# Patient Record
Sex: FEMALE | Race: WHITE | NOT HISPANIC OR LATINO | Employment: OTHER
[De-identification: names, ages, dates, MRNs, and addresses within clinical notes are randomized per-mention and may not be internally consistent; named-entity substitution may affect disease eponyms.]

---

## 2018-03-03 ENCOUNTER — HEALTH MAINTENANCE LETTER (OUTPATIENT)
Age: 48
End: 2018-03-03

## 2018-10-11 ENCOUNTER — TELEPHONE (OUTPATIENT)
Dept: ORTHOPEDICS | Facility: CLINIC | Age: 48
End: 2018-10-11

## 2018-10-12 NOTE — TELEPHONE ENCOUNTER
Paged re: sensation of burning/cold along posterior calf and anterior shin within SLC. Denies new numbness, tingling, or motor weakness. No fever/chills. No increase in pain. Taken off 21-hour elevation restriction a few days ago. Symptoms started yesterday. Current SLC was placed 1 week ago. Discussed with her that she should elevate her leg more to decrease swelling, and this may help reduce irritation of superficial nerves from the cast that could be causing her symptoms. Patient agreeable with this plan. She will call clinic tomorrow to request a cast change.     Robert Rolon [facility]  Orthopaedic PGY4  Pager: [phone]

## 2020-02-10 ENCOUNTER — HEALTH MAINTENANCE LETTER (OUTPATIENT)
Age: 50
End: 2020-02-10

## 2023-08-16 ENCOUNTER — TRANSFERRED RECORDS (OUTPATIENT)
Dept: HEALTH INFORMATION MANAGEMENT | Facility: CLINIC | Age: 53
End: 2023-08-16

## 2023-08-17 ENCOUNTER — TRANSCRIBE ORDERS (OUTPATIENT)
Dept: OTHER | Age: 53
End: 2023-08-17

## 2023-08-17 DIAGNOSIS — M24.9 HYPERMOBILITY OF JOINT: Primary | ICD-10-CM

## 2023-08-22 ENCOUNTER — TRANSFERRED RECORDS (OUTPATIENT)
Dept: HEALTH INFORMATION MANAGEMENT | Facility: CLINIC | Age: 53
End: 2023-08-22

## 2024-05-02 ENCOUNTER — TRANSFERRED RECORDS (OUTPATIENT)
Dept: HEALTH INFORMATION MANAGEMENT | Facility: CLINIC | Age: 54
End: 2024-05-02
Payer: COMMERCIAL

## 2024-05-02 ENCOUNTER — MEDICAL CORRESPONDENCE (OUTPATIENT)
Dept: HEALTH INFORMATION MANAGEMENT | Facility: CLINIC | Age: 54
End: 2024-05-02
Payer: COMMERCIAL

## 2024-05-14 ENCOUNTER — TRANSCRIBE ORDERS (OUTPATIENT)
Dept: OTHER | Age: 54
End: 2024-05-14

## 2024-05-14 DIAGNOSIS — G62.9 NEUROPATHY: Primary | ICD-10-CM

## 2024-05-15 NOTE — TELEPHONE ENCOUNTER
Records Requested     May 15, 2024 1:07 PM   [id number]   Facility  [facility]   Outcome 1:11 pm Sent request for appointment notes to be faxed. -ADRIAN Yanes on 5/21/2024 at 2:31 PM Records received & sent to scanning. -ADRIAN     Records Requested     May 15, 2024 1:15 PM   [id number]   Facility  [facility]   Outcome 1:17 pm Sent request for imaging to be pushed to PACS. -ADRIAN     Action 5/16/24 MV 11.23am   Action Taken [facility] images resolved in PACS       RECORDS RECEIVED FROM: [facility]   REASON FOR VISIT: Neuropathy   PROVIDER: Gregory Kenyon MD   DATE OF APPT: 5/30/24 @ 7:30 am    NOTES (FOR ALL VISITS) STATUS DETAILS   OFFICE NOTE from referring provider Care Everywhere 5/2/24 Hari Jett, RN,CNP  @[facility]     OFFICE NOTE from other specialist Received/CE 10/16/23, 2/6/23 Negra De Leon APRN,DNP  [facility]    Dr Angela Mccarthy @ [facility]:  8/16/23 12/5/22 Vesna Nguyễn APRN,CNP  @[email or web address]    10/7/22 Eladia Leal MD  @[facility]     MEDICATION LIST Internal    IMAGING  (FOR ALL VISITS)     MRI (HEAD, NECK, SPINE) PACS [facility]*  6/12/20 MR Ankle

## 2024-05-20 ENCOUNTER — TRANSFERRED RECORDS (OUTPATIENT)
Dept: HEALTH INFORMATION MANAGEMENT | Facility: CLINIC | Age: 54
End: 2024-05-20
Payer: COMMERCIAL

## 2024-05-30 ENCOUNTER — PRE VISIT (OUTPATIENT)
Dept: NEUROLOGY | Facility: CLINIC | Age: 54
End: 2024-05-30

## 2024-05-30 ENCOUNTER — LAB (OUTPATIENT)
Dept: LAB | Facility: CLINIC | Age: 54
End: 2024-05-30
Payer: COMMERCIAL

## 2024-05-30 ENCOUNTER — OFFICE VISIT (OUTPATIENT)
Dept: NEUROLOGY | Facility: CLINIC | Age: 54
End: 2024-05-30
Attending: NURSE PRACTITIONER
Payer: COMMERCIAL

## 2024-05-30 VITALS
DIASTOLIC BLOOD PRESSURE: 85 MMHG | WEIGHT: 174.9 LBS | SYSTOLIC BLOOD PRESSURE: 128 MMHG | HEART RATE: 52 BPM | OXYGEN SATURATION: 100 % | BODY MASS INDEX: 25.1 KG/M2

## 2024-05-30 DIAGNOSIS — R29.2 HYPERREFLEXIA: Primary | ICD-10-CM

## 2024-05-30 DIAGNOSIS — G62.9 NEUROPATHY: Primary | ICD-10-CM

## 2024-05-30 DIAGNOSIS — G62.9 NEUROPATHY: ICD-10-CM

## 2024-05-30 LAB
ERYTHROCYTE [SEDIMENTATION RATE] IN BLOOD BY WESTERGREN METHOD: 9 MM/HR (ref 0–30)
VIT B12 SERPL-MCNC: 1934 PG/ML (ref 232–1245)

## 2024-05-30 PROCEDURE — 86235 NUCLEAR ANTIGEN ANTIBODY: CPT | Performed by: PSYCHIATRY & NEUROLOGY

## 2024-05-30 PROCEDURE — 82607 VITAMIN B-12: CPT | Performed by: PSYCHIATRY & NEUROLOGY

## 2024-05-30 PROCEDURE — 85652 RBC SED RATE AUTOMATED: CPT | Performed by: PATHOLOGY

## 2024-05-30 PROCEDURE — 83921 ORGANIC ACID SINGLE QUANT: CPT | Performed by: PSYCHIATRY & NEUROLOGY

## 2024-05-30 PROCEDURE — 99204 OFFICE O/P NEW MOD 45 MIN: CPT | Performed by: PSYCHIATRY & NEUROLOGY

## 2024-05-30 PROCEDURE — 86038 ANTINUCLEAR ANTIBODIES: CPT | Performed by: PSYCHIATRY & NEUROLOGY

## 2024-05-30 PROCEDURE — 36415 COLL VENOUS BLD VENIPUNCTURE: CPT | Performed by: PATHOLOGY

## 2024-05-30 RX ORDER — ZALEPLON 5 MG/1
5 CAPSULE ORAL
COMMUNITY

## 2024-05-30 RX ORDER — FLUTICASONE PROPIONATE 50 MCG
SPRAY, SUSPENSION (ML) NASAL
COMMUNITY

## 2024-05-30 RX ORDER — LISDEXAMFETAMINE DIMESYLATE 40 MG/1
1 CAPSULE ORAL EVERY MORNING
COMMUNITY
Start: 2023-05-11

## 2024-05-30 RX ORDER — ESTRADIOL 0.04 MG/D
PATCH, EXTENDED RELEASE TRANSDERMAL
COMMUNITY

## 2024-05-30 RX ORDER — NALTREXONE HYDROCHLORIDE 50 MG/1
TABLET, FILM COATED ORAL
COMMUNITY
Start: 2024-01-02

## 2024-05-30 RX ORDER — ACETAMINOPHEN 500 MG
1000 TABLET ORAL
COMMUNITY

## 2024-05-30 RX ORDER — CYANOCOBALAMIN (VITAMIN B-12) 2500 MCG
2500 TABLET, SUBLINGUAL SUBLINGUAL DAILY
COMMUNITY

## 2024-05-30 RX ORDER — MULTIVIT-MIN/IRON/FOLIC ACID/K 18-600-40
CAPSULE ORAL
COMMUNITY

## 2024-05-30 RX ORDER — PROGESTERONE 100 MG/1
100 CAPSULE ORAL AT BEDTIME
COMMUNITY

## 2024-05-30 RX ORDER — CHOLECALCIFEROL (VITAMIN D3) 125 MCG
CAPSULE ORAL
COMMUNITY

## 2024-05-30 RX ORDER — FAMOTIDINE 40 MG/1
1 TABLET, FILM COATED ORAL
COMMUNITY
Start: 2024-03-25

## 2024-05-30 RX ORDER — VITAMIN B COMPLEX
TABLET ORAL DAILY
COMMUNITY

## 2024-05-30 RX ORDER — CETIRIZINE HYDROCHLORIDE 10 MG/1
CAPSULE, LIQUID FILLED ORAL
COMMUNITY

## 2024-05-30 RX ORDER — IBUPROFEN 400 MG/1
400 TABLET, FILM COATED ORAL
COMMUNITY

## 2024-05-30 ASSESSMENT — PAIN SCALES - GENERAL: PAINLEVEL: MILD PAIN (3)

## 2024-05-30 NOTE — LETTER
01/04/2018  Saadia Rivera 2024       RE: Karissa Rowell  [address] [place] [address]     Dear Colleague,    Thank you for referring your patient, Karissa Rowell, to the [facility] NEUROLOGY [facility] [place] at [facility]. Please see a copy of my visit note below.      DEPARTMENT OF NEUROLOGY    Patient Name:  Karissa Rowell  MRN:  [id number]    :  1970  Date of Clinic Visit:  May 30, 2024  Primary Care Provider:  Gregory Cano    Assessment:   Karissa Rowell is a 53-year-old woman who presents to the neuromuscular clinic this morning for evaluation of burning discomfort in her feet and other symptoms that have raised concern for a potential small fiber neuropathy.  Her examination today is largely normal; she does have some very subtle asymmetry and vibratory sensation at the level of the great toes as well as modestly brisk reflexes with trace spread but no significant asymmetry, clonus, or extensor plantar responses.  It is unclear to me whether her problem is neurologic or rheumatologic in origin.  Her report of dry eyes and dry mouth, taken together with her history of presenting symptoms, does raise some concern for a potential rheumatologic diagnosis, such as Sjogren's syndrome.  We will do some blood work to look into these possibilities today.  I have a relatively low suspicion for a large fiber neuropathy given her essentially normal sensory testing, but I do believe pursuing an EMG would be worthwhile and we could consider a skin biopsy to evaluate for a small fiber neuropathy depending upon the results of this test.  Given her hyperreflexia, I believe it would also be reasonable to obtain a brain MRI to rule out a structural central cause.  She has already had an MRI of her cervical spine completed so we will review these images as well.      Plan:  - We will review the patient's  cervical spine MRI  - MRI of the brain without contrast  - Blood work today (INOCENCIO, DORI, ESR, B12, MMA)  - EMG and skin biopsy of the foot and distal calf if EMG is normal      Patient has been seen with Dr. Kenyon who agrees with my assessment and plan.    Alfredo Cummings MD  [facility] Department of Neurology PGY-4    I personally examined the patient and concur with the resident's note.     Gregory Kenyon M.D.      This note consists of symbols derived from keyboarding, dictation and/or voice recognition software. As a result, there may be errors in the document that have gone undetected. Please consider this when interpreting information presented here within.      ------------------------------------------------------------------------------------------------------------    Chief Complaint: Burning discomfort in the lower extremities      HPI: Karissa Rowell is a 53-year-old woman with a history of anxiety, Altagracia-Danlos, mast cell activation syndrome, and cervical dystonia who presents to neurology for evaluation of burning discomfort in the lower extremities.  She explains that she had been in normal health up until 3 years ago, around the time that she experienced a GI illness with diarrhea and nausea, which she believes to be related to COVID (although she was not formally tested at that time).  Around that same time, she had a right midfoot fusion with subsequent development of burning pain in the right foot.  This was initially thought to represent complex regional pain syndrome, and she underwent formal testing for this; per her recollection, she almost met criteria for CRPS, although she does not believe that this is the correct diagnosis for her symptoms, as she later went on to develop the same burning pain in her left foot.  At present, she feels as though the burning discomfort is more significant in her left foot than the right.      Generally speaking, this burning discomfort  is present about 90% of the time and seems to get worse throughout the day; it involves the entire foot and lower leg up to about the mid shin.  She has found that wearing socks seems to be particularly bothersome.  In fact, she reports that she had to wear sandals for about 7 months last year due to the irritation that socks would cause her feet.  She reports that wearing socks is still bothersome, but not quite as bad.  She also notes that, given concern for POTS, she started wearing compression stockings; these had been very helpful in terms of giving her energy and improving her brain fog, but she was not able to tolerate the irritation these would cause her.  She also notes that wearing tight shoes seems to make her symptoms worse.  Of note, she had previously been prescribed gabapentin for her burning pain but this was ineffective in treating her pain and caused worsening suicidal ideation.    The patient has never seen a rheumatologist or neurologist before, although she has been undergoing a workup with Dr. Mccarthy who is an expert in Altagracia-Danlos.  As part of that workup, she did have imaging done of her cervical spine, which reportedly showed evidence of a Chiari malformation.  The patient has not noticed that her symptoms get worse with coughing, bearing down, or with neck movements.  She does have cervical dystonia and was receiving Botox for this, but she does not plan on continuing this treatment due to her underlying connective tissue disorder.  She does endorse constant neck pain that is responsive to ibuprofen.    In addition to the symptoms outlined above, the patient does endorse intermittent skin discoloration.  She explains that her fingers will intermittently become pale without a clear trigger, such as a change in temperature.  She also adds that her feet will sometimes get very bright after standing for a few minutes.  She notes that her feet will oftentimes get red after taking a shower but  "she is not sure if this is accompanied by any change in her symptoms.  She also notes that her skin has appeared increasingly shiny over the past few years.  Her hands and feet also feel cold at all times and she will have intermittent digit numbness and zingers.  She reports that sensation of pressure, temperature, and pain are preserved in her feet, although light touch does cause her pain.    The patient denies any trouble with constipation.  She will intermittently have some diarrhea but thinks that this is more diet related.  Since the onset of her symptoms, she feels like her appetite has gone down, although she reports that she has gained about 20 pounds in the past 2 years due to her inability to maintain her exercise routine.  She does feel as though her gait is a little more unsteady and that she is no longer walking as assertive as she had previously been.  She also endorses feeling a little off balance in general relative to her previous self.    In terms of hand functioning, she does feel like there has been some reduction in her ability to feel things with her hands and she now was needing to be more intentional with grabbing items.    The patient also endorses chronically dry eyes as well as dry mouth.  She will intermittently notice red, inflamed joints in her hands.  The patient is an only child and does not have any children.  Her mother does have weakness and numbness in her feet, and her maternal aunt has similar symptoms as well.  She reports that her father has numbness in his feet as well but he also has a history of back surgery.    Vital signs:      BP: 128/85 Pulse: 52     SpO2: 100 %       Weight: 79.3 kg (174 lb 14.4 oz)  Estimated body mass index is 25.1 kg/m  as calculated from the following:    Height as of 7/17/15: 1.778 m (5' 10\").    Weight as of this encounter: 79.3 kg (174 lb 14.4 oz).    Examination:   -General: Sitting comfortably on the chair. No acute distress.  -HEENT: No " skin discolorations noted. Head is normocephalic, atraumatic.   -Pulm: Normal work of breathing on RA.   -Abdomen: Non-distended.   -Skin: Clinodactyly noted.  Mild mottling of the skin in the feet.  No abnormal     -Neurological:   --MS: Patient is alert, attentive, and oriented. Speech is clear and fluid. Names and repeats normally. Remote memory intact.   --CNs: Pupils symmetric, round and reactive to light. No APD. Visual fields are full. Ocular motility is full without nystagmus. Facial sensation intact. Muscles of mastication and facial expression normal. Hearing intact to conversation. Shoulder shrug is strong and symmetric. Tongue motions normal.   --Motor: Normal muscle tone and bulk. Muscle strength is 5/5 and symmetric with testing of the deltoids, biceps, triceps, wrist extension, finger abduction, hip flexors and extensors, knee flexors/extensors, and ankle dorsi- and plantarflexion.  --Reflexes: 2+ and symmetric at biceps, BR, patellae and Achilles. Spread to the finger flexors on right with BR and with biceps and BR on the left. Wagner positive bilaterally, more so on the R than L. Crossed adduction noted bilaterally. Plantar responses mute bilaterally.   --Sensory: Light touch intact throughout upper and lower extremities. Vibration extinguishes after 5 seconds with testing at the R great toe, and 8 seconds with testing at the L great toe. Normal pinprick sensation in BLE. Negative Romberg.  --Coordination: Heel-shin and finger-nose-finger without dysmetria.   --Gait: Stands with feet normally spaced. Gait is steady. Normal arm swing. Mild difficulty with walking on heels and in tandem.         Again, thank you for allowing me to participate in the care of your patient.      Sincerely,    Gregory Kenyon MD

## 2024-05-30 NOTE — PROGRESS NOTES
DEPARTMENT OF NEUROLOGY    Patient Name:  Karissa Rowell  MRN:  [id number]    :  1970  Date of Clinic Visit:  May 30, 2024  Primary Care Provider:  Gregory Cano    Assessment:   Karissa Rowell is a 53-year-old woman who presents to the neuromuscular clinic this morning for evaluation of burning discomfort in her feet and other symptoms that have raised concern for a potential small fiber neuropathy.  Her examination today is largely normal; she does have some very subtle asymmetry and vibratory sensation at the level of the great toes as well as modestly brisk reflexes with trace spread but no significant asymmetry, clonus, or extensor plantar responses.  It is unclear to me whether her problem is neurologic or rheumatologic in origin.  Her report of dry eyes and dry mouth, taken together with her history of presenting symptoms, does raise some concern for a potential rheumatologic diagnosis, such as Sjogren's syndrome.  We will do some blood work to look into these possibilities today.  I have a relatively low suspicion for a large fiber neuropathy given her essentially normal sensory testing, but I do believe pursuing an EMG would be worthwhile and we could consider a skin biopsy to evaluate for a small fiber neuropathy depending upon the results of this test.  Given her hyperreflexia, I believe it would also be reasonable to obtain a brain MRI to rule out a structural central cause.  She has already had an MRI of her cervical spine completed so we will review these images as well.      Plan:  - We will review the patient's cervical spine MRI  - MRI of the brain without contrast  - Blood work today (INOCENCIO, DORI, ESR, B12, MMA)  - EMG and skin biopsy of the foot and distal calf if EMG is normal      Patient has been seen with Dr. Kenyon who agrees with my assessment and plan.    Alfredo Cummings MD  [facility] Department of Neurology PGY-4    I personally examined  the patient and concur with the resident's note.     Gregory Kenyon M.D.      This note consists of symbols derived from keyboarding, dictation and/or voice recognition software. As a result, there may be errors in the document that have gone undetected. Please consider this when interpreting information presented here within.      ------------------------------------------------------------------------------------------------------------    Chief Complaint: Burning discomfort in the lower extremities      HPI: Karissa Rowell is a 53-year-old woman with a history of anxiety, Altagracia-Danlos, mast cell activation syndrome, and cervical dystonia who presents to neurology for evaluation of burning discomfort in the lower extremities.  She explains that she had been in normal health up until 3 years ago, around the time that she experienced a GI illness with diarrhea and nausea, which she believes to be related to COVID (although she was not formally tested at that time).  Around that same time, she had a right midfoot fusion with subsequent development of burning pain in the right foot.  This was initially thought to represent complex regional pain syndrome, and she underwent formal testing for this; per her recollection, she almost met criteria for CRPS, although she does not believe that this is the correct diagnosis for her symptoms, as she later went on to develop the same burning pain in her left foot.  At present, she feels as though the burning discomfort is more significant in her left foot than the right.      Generally speaking, this burning discomfort is present about 90% of the time and seems to get worse throughout the day; it involves the entire foot and lower leg up to about the mid shin.  She has found that wearing socks seems to be particularly bothersome.  In fact, she reports that she had to wear sandals for about 7 months last year due to the irritation that socks would cause her feet.   She reports that wearing socks is still bothersome, but not quite as bad.  She also notes that, given concern for POTS, she started wearing compression stockings; these had been very helpful in terms of giving her energy and improving her brain fog, but she was not able to tolerate the irritation these would cause her.  She also notes that wearing tight shoes seems to make her symptoms worse.  Of note, she had previously been prescribed gabapentin for her burning pain but this was ineffective in treating her pain and caused worsening suicidal ideation.    The patient has never seen a rheumatologist or neurologist before, although she has been undergoing a workup with Dr. Mccarthy who is an expert in Altagracia-Danlos.  As part of that workup, she did have imaging done of her cervical spine, which reportedly showed evidence of a Chiari malformation.  The patient has not noticed that her symptoms get worse with coughing, bearing down, or with neck movements.  She does have cervical dystonia and was receiving Botox for this, but she does not plan on continuing this treatment due to her underlying connective tissue disorder.  She does endorse constant neck pain that is responsive to ibuprofen.    In addition to the symptoms outlined above, the patient does endorse intermittent skin discoloration.  She explains that her fingers will intermittently become pale without a clear trigger, such as a change in temperature.  She also adds that her feet will sometimes get very bright after standing for a few minutes.  She notes that her feet will oftentimes get red after taking a shower but she is not sure if this is accompanied by any change in her symptoms.  She also notes that her skin has appeared increasingly shiny over the past few years.  Her hands and feet also feel cold at all times and she will have intermittent digit numbness and zingers.  She reports that sensation of pressure, temperature, and pain are preserved in her  "feet, although light touch does cause her pain.    The patient denies any trouble with constipation.  She will intermittently have some diarrhea but thinks that this is more diet related.  Since the onset of her symptoms, she feels like her appetite has gone down, although she reports that she has gained about 20 pounds in the past 2 years due to her inability to maintain her exercise routine.  She does feel as though her gait is a little more unsteady and that she is no longer walking as assertive as she had previously been.  She also endorses feeling a little off balance in general relative to her previous self.    In terms of hand functioning, she does feel like there has been some reduction in her ability to feel things with her hands and she now was needing to be more intentional with grabbing items.    The patient also endorses chronically dry eyes as well as dry mouth.  She will intermittently notice red, inflamed joints in her hands.  The patient is an only child and does not have any children.  Her mother does have weakness and numbness in her feet, and her maternal aunt has similar symptoms as well.  She reports that her father has numbness in his feet as well but he also has a history of back surgery.      Vital signs:      BP: 128/85 Pulse: 52     SpO2: 100 %       Weight: 79.3 kg (174 lb 14.4 oz)  Estimated body mass index is 25.1 kg/m  as calculated from the following:    Height as of 7/17/15: 1.778 m (5' 10\").    Weight as of this encounter: 79.3 kg (174 lb 14.4 oz).      Examination:   -General: Sitting comfortably on the chair. No acute distress.  -HEENT: No skin discolorations noted. Head is normocephalic, atraumatic.   -Pulm: Normal work of breathing on RA.   -Abdomen: Non-distended.   -Skin: Clinodactyly noted.  Mild mottling of the skin in the feet.  No abnormal     -Neurological:   --MS: Patient is alert, attentive, and oriented. Speech is clear and fluid. Names and repeats normally. Remote " memory intact.   --CNs: Pupils symmetric, round and reactive to light. No APD. Visual fields are full. Ocular motility is full without nystagmus. Facial sensation intact. Muscles of mastication and facial expression normal. Hearing intact to conversation. Shoulder shrug is strong and symmetric. Tongue motions normal.   --Motor: Normal muscle tone and bulk. Muscle strength is 5/5 and symmetric with testing of the deltoids, biceps, triceps, wrist extension, finger abduction, hip flexors and extensors, knee flexors/extensors, and ankle dorsi- and plantarflexion.  --Reflexes: 2+ and symmetric at biceps, BR, patellae and Achilles. Spread to the finger flexors on right with BR and with biceps and BR on the left. Wagner positive bilaterally, more so on the R than L. Crossed adduction noted bilaterally. Plantar responses mute bilaterally.   --Sensory: Light touch intact throughout upper and lower extremities. Vibration extinguishes after 5 seconds with testing at the R great toe, and 8 seconds with testing at the L great toe. Normal pinprick sensation in BLE. Negative Romberg.  --Coordination: Heel-shin and finger-nose-finger without dysmetria.   --Gait: Stands with feet normally spaced. Gait is steady. Normal arm swing. Mild difficulty with walking on heels and in tandem.

## 2024-05-30 NOTE — PATIENT INSTRUCTIONS
We discussed considerations. Recommendations:    We will review your cervical spine MRI.  Brain MRI  Blood tests (INOCENCIO, DORI, ESR, B12, MMA)  EMG, and skin biopsy of the foot and distal calf if EMG is normal    Testing can be done at [place] or [place]

## 2024-05-31 LAB
ANA SER QL IF: NEGATIVE
ENA SM IGG SER IA-ACNC: <0.7 U/ML
ENA SM IGG SER IA-ACNC: NEGATIVE
ENA SS-A AB SER IA-ACNC: <0.5 U/ML
ENA SS-A AB SER IA-ACNC: NEGATIVE
ENA SS-B IGG SER IA-ACNC: <0.6 U/ML
ENA SS-B IGG SER IA-ACNC: NEGATIVE
U1 SNRNP IGG SER IA-ACNC: <1.1 U/ML
U1 SNRNP IGG SER IA-ACNC: NEGATIVE

## 2024-06-04 ENCOUNTER — TELEPHONE (OUTPATIENT)
Dept: NEUROLOGY | Facility: CLINIC | Age: 54
End: 2024-06-04
Payer: COMMERCIAL

## 2024-06-04 NOTE — TELEPHONE ENCOUNTER
Left Voicemail (1st Attempt) and Sent Mychart (1st Attempt) for the patient to call back and schedule the following:    Appointment type: EMG  Provider: EMG provider   Return date: first available   Specialty phone number: [phone]  Additional appointment(s) needed:   -MRI    Additonal Notes: Help patient schedule EMG and MRI        Adia Rinaldi on 6/4/2024 at 3:02 PM

## 2024-06-06 NOTE — TELEPHONE ENCOUNTER
Left Voicemail (2nd Attempt) for the patient to call back and schedule the following:      Appointment type: EMG  Provider: EMG provider   Return date: first available   Specialty phone number: [phone]  Additional appointment(s) needed:   -MRI    Additonal Notes: Help patient schedule EMG and MRI    2nd attempt made LVM x2, sent MyC        Adia Rinaldi on 6/6/2024 at 9:05 AM

## 2024-06-13 LAB
Lab: NORMAL
PERFORMING LABORATORY: NORMAL
SPECIMEN STATUS: NORMAL
TEST NAME: NORMAL

## 2024-06-15 LAB — MISCELLANEOUS TEST 1 (ARUP): NORMAL

## 2024-06-20 ENCOUNTER — MYC MEDICAL ADVICE (OUTPATIENT)
Dept: NEUROLOGY | Facility: CLINIC | Age: 54
End: 2024-06-20
Payer: COMMERCIAL

## 2024-06-21 NOTE — CONFIDENTIAL NOTE
MRI reports available through [facility] Every[facility]. Called [facility] and request MRI's be pushed to [facility]. Will notify Dr. Kenyon.    Ariane Rondon RN

## 2024-08-12 ENCOUNTER — ANCILLARY PROCEDURE (OUTPATIENT)
Dept: MRI IMAGING | Facility: CLINIC | Age: 54
End: 2024-08-12
Attending: PSYCHIATRY & NEUROLOGY
Payer: COMMERCIAL

## 2024-08-12 DIAGNOSIS — R29.2 HYPERREFLEXIA: ICD-10-CM

## 2024-08-12 PROCEDURE — 70551 MRI BRAIN STEM W/O DYE: CPT | Performed by: RADIOLOGY

## 2024-12-05 ENCOUNTER — TRANSCRIBE ORDERS (OUTPATIENT)
Dept: OTHER | Age: 54
End: 2024-12-05

## 2024-12-05 ENCOUNTER — TRANSFERRED RECORDS (OUTPATIENT)
Dept: HEALTH INFORMATION MANAGEMENT | Facility: CLINIC | Age: 54
End: 2024-12-05
Payer: COMMERCIAL

## 2024-12-05 DIAGNOSIS — Q79.62 HYPERMOBILE EHLERS-DANLOS SYNDROME: Primary | ICD-10-CM

## 2025-03-11 ENCOUNTER — VIRTUAL VISIT (OUTPATIENT)
Dept: PHYSICAL THERAPY | Facility: CLINIC | Age: 55
End: 2025-03-11
Attending: FAMILY MEDICINE
Payer: COMMERCIAL

## 2025-03-11 DIAGNOSIS — Q79.62 HYPERMOBILE EHLERS-DANLOS SYNDROME: Primary | ICD-10-CM

## 2025-03-11 PROCEDURE — 97110 THERAPEUTIC EXERCISES: CPT | Mod: GP,GT,95 | Performed by: PHYSICAL THERAPIST

## 2025-04-28 ENCOUNTER — TELEPHONE (OUTPATIENT)
Dept: PHYSICAL THERAPY | Facility: CLINIC | Age: 55
End: 2025-04-28

## 2025-04-28 ENCOUNTER — OFFICE VISIT (OUTPATIENT)
Dept: NEUROLOGY | Facility: CLINIC | Age: 55
End: 2025-04-28
Payer: COMMERCIAL

## 2025-04-28 ENCOUNTER — TELEPHONE (OUTPATIENT)
Dept: NEUROLOGY | Facility: CLINIC | Age: 55
End: 2025-04-28

## 2025-04-28 DIAGNOSIS — R20.2 NUMBNESS AND TINGLING OF BOTH LEGS: ICD-10-CM

## 2025-04-28 DIAGNOSIS — Z92.89 HISTORY OF SENSORY CHANGES: Primary | ICD-10-CM

## 2025-04-28 DIAGNOSIS — R20.0 NUMBNESS AND TINGLING OF BOTH LEGS: ICD-10-CM

## 2025-04-28 DIAGNOSIS — R20.2 NUMBNESS AND TINGLING OF BOTH FEET: ICD-10-CM

## 2025-04-28 DIAGNOSIS — R20.0 NUMBNESS AND TINGLING OF BOTH FEET: ICD-10-CM

## 2025-04-28 DIAGNOSIS — R20.9 ALTERATION IN SENSORY PERCEPTION: Primary | ICD-10-CM

## 2025-04-28 PROCEDURE — 95913 NRV CNDJ TEST 13/> STUDIES: CPT | Performed by: PSYCHIATRY & NEUROLOGY

## 2025-04-28 PROCEDURE — 95885 MUSC TST DONE W/NERV TST LIM: CPT | Performed by: PSYCHIATRY & NEUROLOGY

## 2025-04-28 PROCEDURE — 88348 ELECTRON MICROSCOPY DX: CPT | Performed by: PSYCHIATRY & NEUROLOGY

## 2025-04-28 PROCEDURE — 11105 PUNCH BX SKIN EA SEP/ADDL: CPT | Mod: LT

## 2025-04-28 PROCEDURE — 11104 PUNCH BX SKIN SINGLE LESION: CPT | Mod: LT

## 2025-04-28 NOTE — TELEPHONE ENCOUNTER
2 skin samples taken. Left foot and left distal calf. Send to the lab via pneumatic tube system. To be delivered to the [facility] for ENF Density testing.

## 2025-04-28 NOTE — PROGRESS NOTES
[facility]  Electrodiagnostic Laboratory                 Department of Neurology                                                                                                         Test Date:  2025    Patient: Karissa Rowell : 1970 Physician: Joseluis Stanley MD   Sex: Female AGE: 54 year Ref Phys: Gregory Kenyon MD   ID#: [id number]   Technician: Devante Allen     History and Examination:  54 year old with burning discomfort in her hands and feet. This study is being performed to investigate for polyneuropathy.     Techniques:  Motor and sensory conduction studies were done with surface recording electrodes. EMG was done with a concentric needle electrode.     Results  Nerve conduction studies:   1. Bilateral sural, bilateral superficial peroneal, right median-D2 and right ulnar-D5 sensory responses are normal.   2. Right median-ulnar palmar interlatency difference is normal.   3. Right median-APB, right ulnar-ADM, bilateral peroneal-EDB and bilateral tibial-AH motor responses are normal.     Needle EMG of selected proximal and distal left lower limb muscles was performed as tabulated below. No abnormal spontaneous activity was observed in the sampled muscles. Motor unit potential morphology and recruitment patterns were normal.     Interpretation:  This is a normal study. There is no electrophysiologic evidence of a large fiber polyneuropathy affecting the upper or lower limbs on the basis of this study.     Joseluis Stanley MD  Department of Neurology      Nerve Conduction Studies  Motor Sites      Latency Neg. Amp Neg. Amp Diff Segment Distance Velocity Neg. Dur Neg Area Diff Temperature Comment   Site (ms) Norm (mV) Norm (%)  cm m/s Norm (ms) (%) ( C)    Left Fibular (EDB) Motor   Ankle 3.7  < 6.0 3.5 -  Ankle-EDB 8   5.8  31.1    Right Fibular (EDB) Motor   Ankle 4.6  < 6.0 3.5 -  Ankle-EDB 8   6.3  30.1    Bel Fibular Head 11.8 - 3.4 - -3 Bel Fibular  Head-Ankle 32 44  > 38 6.7 4 30.1    Pop Fossa 13.7 - 3.4 - 0 Pop Fossa-Bel Fibular Head 8.5 45  > 38 6.6 0 30.1    Right Median (APB) Motor   Wrist 3.9  < 4.4 8.6  > 5.0  Wrist-APB 8   6.2  31.2    Elbow 8.3 - 7.5  > 5.0 -13 Elbow-Wrist 21.5 49  > 48 6.5 -16 31.3    Left Tibial (AHB) Motor   Ankle 3.6  < 6.5 8.1  > 5.0  Ankle-AH 8   5.6  30.9    Right Tibial (AHB) Motor   Ankle 3.0  < 6.5 9.6  > 5.0  Ankle-AH 8   6.2  29.3    Knee 12.5 - 8.5 - -11 Knee-Ankle 40 42  > 38 5.9 -18 29.3    Right Ulnar (ADM) Motor   Wrist 3.5  < 3.5 7.7  > 5.0  Wrist-ADM 8   5.6  31.5    Below Elbow 7.4 - 7.3 - -5 Below Elbow-Wrist 19.5 50  > 48 5.7 -1 31.3    Above Elbow 8.9 - 7.3 - 0 Above Elbow-Below Elbow 9.5 63  > 48 5.9 -2 31.5      F-Wave Sites      Min F-Lat Max-Min F-Lat Mean F-Lat   Site (ms) (ms) (ms)   Left Tibial F-Wave   Ankle 49.8 5.7 -   Right Tibial F-Wave   Ankle 51.2 3.7 -   Right Ulnar F-Wave   Wrist 25.1 4.9 26.9     Sensory Sites      Onset Lat Peak Lat Amp (O-P) Amp (P-P) Segment Distance Velocity Temperature Comment   Site ms (ms)  V Norm ( V)  cm m/s Norm ( C)    Right Median Sensory   Wrist-Dig II 2.9 3.8 30  > 10 50 Wrist-Dig II 14 48  > 48 31.9    Right Median-Ulnar Palmar Sensory        Median   Palm-Wrist 1.80 2.4 52 - 54 Palm-Wrist 8 44 - 31         Ulnar   Palm-Wrist 1.68 2.3 20 - 31 Palm-Wrist 8 48 - 31    Left Superficial Fibular Sensory   Lower Leg-Ankle 2.9 3.4 3  > 3 6 Lower Leg-Ankle 12.5 43 - 31    Right Superficial Fibular Sensory   Lower Leg-Ankle 2.7 3.8 3  > 3 4 Lower Leg-Ankle 12.5 46 - 31.8    Left Sural Sensory   Calf-Lat Malleolus 3.5 4.5 7  > 5 8 Calf-Lat Malleolus 14 40  > 38 31.6    Right Sural Sensory   Calf-Lat Malleolus 3.1 4.0 7  > 5 8 Calf-Lat Malleolus 14 45  > 38 29.2    Right Ulnar Sensory   Wrist-Dig V 2.6 3.5 25  > 8 39 Wrist-Dig V 12.5 48  > 48 31.8      Inter-Nerve Comparisons     Nerve 1 Value 1 Nerve 2 Value 2 Parameter Result Normal   Sensory Sites   R Median Palm-Wrist  2.4 ms R Ulnar Palm-Wrist 2.3 ms Peak Lat Diff 0.10 ms <0.40       Electromyography     Side Muscle Ins Act Fibs/PSW Fasc HF Amp Dur Poly Recrt Int Pat   Left Vastus lat Nml None Nml 0 Nml Nml 0 Nml Nml   Left Tib ant Nml None Nml 0 Nml Nml 0 Nml Nml   Left Gastroc Nml None Nml 0 Nml Nml 0 Nml Nml         Waveforms / Images:    Motor                    Sensory                         F-Wave

## 2025-04-28 NOTE — PROGRESS NOTES
Procedure Note: Neurodiagnostic skin biopsy   ?Reason for biopsy: Pain and paresthesias in feet. Eval for small fiber neuropathy.   Written informed consent was obtained.   ?   The standard sites on the left foot dorsum and lateral leg 10 cm proximal to the lateral malleolus were sterilely prepped. Xylocaine 1% was administered by subdermal injection. A total of 6 mL was used. A 3 mm punch biopsy was removed from each site. The specimens were placed in fixative. The specimen from the foot was placed in a vial labeled as left foot and the specimen from the lateral leg was labeled as left lateral leg. The presence of the specimen in the vial was verified by two individuals. After verification the samples were sent to the neuropathology laboratory for analysis. The biopsy sites were dressed with a pressure bandage. Estimated blood loss was less than 10 drops total. The patient was informed about post-procedure bandage and biopsy site care. She was advised that it might take up to 4 weeks for results of the test to be available. No complications.      Joseluis Stanley MD  Department of Neurology

## 2025-05-08 ENCOUNTER — TRANSCRIBE ORDERS (OUTPATIENT)
Dept: OTHER | Age: 55
End: 2025-05-08

## 2025-05-08 DIAGNOSIS — Q79.60 EHLERS-DANLOS SYNDROME: Primary | ICD-10-CM

## 2025-05-14 LAB — SCANNED LAB RESULT: ABNORMAL

## 2025-05-14 NOTE — LETTER
2025       RE: Karissa Rowell  [address] [place] [address]     Dear Colleague,    Thank you for referring your patient, Karissa Rowell, to the [facility] EMG CLINIC [place] [facility]. Please see a copy of my visit note below.                        [facility]  Electrodiagnostic Laboratory                 Department of Neurology                                                                                                         Test Date:  2025    Patient: Karissa Rowell : 1970 Physician: Joseluis Stanley MD   Sex: Female AGE: 54 year Ref Phys: Gregory Kenyon MD   ID#: [id number]   Technician: Devante Allen     History and Examination:  54 year old with burning discomfort in her hands and feet. This study is being performed to investigate for polyneuropathy.     Techniques:  Motor and sensory conduction studies were done with surface recording electrodes. EMG was done with a concentric needle electrode.     Results  Nerve conduction studies:   1. Bilateral sural, bilateral superficial peroneal, right median-D2 and right ulnar-D5 sensory responses are normal.   2. Right median-ulnar palmar interlatency difference is normal.   3. Right median-APB, right ulnar-ADM, bilateral peroneal-EDB and bilateral tibial-AH motor responses are normal.     Needle EMG of selected proximal and distal left lower limb muscles was performed as tabulated below. No abnormal spontaneous activity was observed in the sampled muscles. Motor unit potential morphology and recruitment patterns were normal.     Interpretation:  This is a normal study. There is no electrophysiologic evidence of a large fiber polyneuropathy affecting the upper or lower limbs on the basis of this study.     Joseluis Stanley MD  Department of Neurology      Nerve Conduction Studies  Motor Sites      Latency Neg. Amp Neg. Amp  Incoming call from patient regarding this request. I don't see approval from provider yet.     Ryan Whitehead RN     [facility]     Diff Segment Distance Velocity Neg. Dur Neg Area Diff Temperature Comment   Site (ms) Norm (mV) Norm (%)  cm m/s Norm (ms) (%) ( C)    Left Fibular (EDB) Motor   Ankle 3.7  < 6.0 3.5 -  Ankle-EDB 8   5.8  31.1    Right Fibular (EDB) Motor   Ankle 4.6  < 6.0 3.5 -  Ankle-EDB 8   6.3  30.1    Bel Fibular Head 11.8 - 3.4 - -3 Bel Fibular Head-Ankle 32 44  > 38 6.7 4 30.1    Pop Fossa 13.7 - 3.4 - 0 Pop Fossa-Bel Fibular Head 8.5 45  > 38 6.6 0 30.1    Right Median (APB) Motor   Wrist 3.9  < 4.4 8.6  > 5.0  Wrist-APB 8   6.2  31.2    Elbow 8.3 - 7.5  > 5.0 -13 Elbow-Wrist 21.5 49  > 48 6.5 -16 31.3    Left Tibial (AHB) Motor   Ankle 3.6  < 6.5 8.1  > 5.0  Ankle-AH 8   5.6  30.9    Right Tibial (AHB) Motor   Ankle 3.0  < 6.5 9.6  > 5.0  Ankle-AH 8   6.2  29.3    Knee 12.5 - 8.5 - -11 Knee-Ankle 40 42  > 38 5.9 -18 29.3    Right Ulnar (ADM) Motor   Wrist 3.5  < 3.5 7.7  > 5.0  Wrist-ADM 8   5.6  31.5    Below Elbow 7.4 - 7.3 - -5 Below Elbow-Wrist 19.5 50  > 48 5.7 -1 31.3    Above Elbow 8.9 - 7.3 - 0 Above Elbow-Below Elbow 9.5 63  > 48 5.9 -2 31.5      F-Wave Sites      Min F-Lat Max-Min F-Lat Mean F-Lat   Site (ms) (ms) (ms)   Left Tibial F-Wave   Ankle 49.8 5.7 -   Right Tibial F-Wave   Ankle 51.2 3.7 -   Right Ulnar F-Wave   Wrist 25.1 4.9 26.9     Sensory Sites      Onset Lat Peak Lat Amp (O-P) Amp (P-P) Segment Distance Velocity Temperature Comment   Site ms (ms)  V Norm ( V)  cm m/s Norm ( C)    Right Median Sensory   Wrist-Dig II 2.9 3.8 30  > 10 50 Wrist-Dig II 14 48  > 48 31.9    Right Median-Ulnar Palmar Sensory        Median   Palm-Wrist 1.80 2.4 52 - 54 Palm-Wrist 8 44 - 31         Ulnar   Palm-Wrist 1.68 2.3 20 - 31 Palm-Wrist 8 48 - 31    Left Superficial Fibular Sensory   Lower Leg-Ankle 2.9 3.4 3  > 3 6 Lower Leg-Ankle 12.5 43 - 31    Right Superficial Fibular Sensory   Lower Leg-Ankle 2.7 3.8 3  > 3 4 Lower Leg-Ankle 12.5 46 - 31.8    Left Sural Sensory   Calf-Lat Malleolus 3.5 4.5 7  > 5 8 Calf-Lat  Malleolus 14 40  > 38 31.6    Right Sural Sensory   Calf-Lat Malleolus 3.1 4.0 7  > 5 8 Calf-Lat Malleolus 14 45  > 38 29.2    Right Ulnar Sensory   Wrist-Dig V 2.6 3.5 25  > 8 39 Wrist-Dig V 12.5 48  > 48 31.8      Inter-Nerve Comparisons     Nerve 1 Value 1 Nerve 2 Value 2 Parameter Result Normal   Sensory Sites   R Median Palm-Wrist 2.4 ms R Ulnar Palm-Wrist 2.3 ms Peak Lat Diff 0.10 ms <0.40       Electromyography     Side Muscle Ins Act Fibs/PSW Fasc HF Amp Dur Poly Recrt Int Pat   Left Vastus lat Nml None Nml 0 Nml Nml 0 Nml Nml   Left Tib ant Nml None Nml 0 Nml Nml 0 Nml Nml   Left Gastroc Nml None Nml 0 Nml Nml 0 Nml Nml         Waveforms / Images:    Motor                    Sensory                         F-Wave                     Again, thank you for allowing me to participate in the care of your patient.      Sincerely,    Joseluis Stanley MD

## 2025-06-11 ENCOUNTER — TELEPHONE (OUTPATIENT)
Dept: NEUROLOGY | Facility: CLINIC | Age: 55
End: 2025-06-11
Payer: COMMERCIAL

## 2025-06-11 NOTE — TELEPHONE ENCOUNTER
[facility] Call Center    Phone Message    May a detailed message be left on voicemail: yes     Reason for Call: Pt requesting to change tomorrows appointment to virtual. Please call Karissa at [phone] to discuss further.    Action Taken: Message routed to:  [facility] Neurology    Travel Screening: Not Applicable     Date of Service:

## 2025-06-12 ENCOUNTER — VIRTUAL VISIT (OUTPATIENT)
Dept: NEUROLOGY | Facility: CLINIC | Age: 55
End: 2025-06-12
Payer: COMMERCIAL

## 2025-06-12 DIAGNOSIS — M35.00 SICCA SYNDROME: ICD-10-CM

## 2025-06-12 DIAGNOSIS — R20.9 ALTERATION IN SENSORY PERCEPTION: ICD-10-CM

## 2025-06-12 DIAGNOSIS — Z92.89 HISTORY OF SENSORY CHANGES: Primary | ICD-10-CM

## 2025-06-12 NOTE — NURSING NOTE
Current patient location: [address] [place] [address]    Is the patient currently in the state of [place]? YES    Visit mode: VIDEO    If the visit is dropped, the patient can be reconnected by:VIDEO VISIT: Text to cell phone:   Telephone Information:   Mobile [phone]       Will anyone else be joining the visit? NO  (If patient encounters technical issues they should call [phone])    Are changes needed to the allergy or medication list? No, Pt stated no changes to allergies, and Pt stated no med changes    Are refills needed on medications prescribed by this physician? NO    Rooming Documentation:  Patient will complete questionnaire(s) in [employer]    Reason for visit: RECHHENRIK HILLF

## 2025-06-12 NOTE — PATIENT INSTRUCTIONS
We reviewed your results and symptoms today. Recommendations are as follows:    I will make a rheumatology referral for possible seronegative Sjogren's syndrome.  I will discuss your autonomic symptoms with a colleague, and whether you are a candidate for further testing.   Review your spine and brain imaging with a neurosurgeon; you indicated there are experts in the field famiiar with EDS.  Please ask your eye doctor to evaluate for objective evidence of reduced tear production, either by Schirmer's test or another method.  GI referral locally or gastric emptying test.  Return as needed. Keep in touch with [employer] in the interim.     Please call Joana @ [phone] for questions or concerns during regular business hours. For a more efficient way to communicate, use [employer] and address the message to your physician. Remember, [employer] is only read during business hours. Do not leave urgent messages on voicemail or [employer]. If situation is urgent, contact the Neurology Clinic @ [phone] and ask to speak to a Triage Nurse or Call 911 or visit an Emergency Department.     Please call your pharmacy if you need a medication refill. They will send us an electronic message.

## 2025-06-12 NOTE — LETTER
"6/12/2025       RE: Karissa Rowell  [address] [place] [address]     Dear Colleague,    Thank you for referring your patient, Karissa Rowell, to the [facility] NEUROLOGY CLINIC [place] [facility]. Please see a copy of my visit note below.    Virtual Visit Details    Type of service:  Video Visit     Originating Location (pt. Location): Home    Distant Location (provider location):  On-site  Platform used for Video Visit: [facility]    Follow up visit for 54 year old woman seen initially for burning sensation in feet. This has improved significantly, though she still perceives a \"chronic low grade buzzing\" in the feet. Still has focal sensitivity and sensory loss in the plantar aspect of the left foot. She has also noted intermittent belching, loose stools, flatulence, and abdominal distention recently. Reduced perception of need to urinate, reduced stream, and sexual dysfunction. These symptoms have developed within the past 6 months. 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 She wonders whether this is related. 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     Impression    Sensory symptoms without documented evidence of neuropathy.  Several symptoms as noted above, query whether a unifying diagnosis is present.      Recommendations [copied from patient instructions]    We reviewed your results and symptoms today. Recommendations are as follows:    I will make a rheumatology referral for possible seronegative Sjogren's syndrome.  I will discuss your autonomic symptoms with a colleague, and whether you are a candidate for further testing.   Review your spine and brain imaging with a neurosurgeon; you indicated there are experts in the field famiiar with EDS.  Please ask your eye doctor to evaluate for objective evidence of reduced tear production, either by Schirmer's test or another " method.  GI referral locally or gastric emptying test.  Return as needed. [facility].         Gregory Kenyon M.D.      40 minutes spent on the date of the encounter on chart review, history and examination, documentation and further activities as noted above.     Addendum: discussed titin variant informally with genetics service; this is in a region, and the type of mutation, that could cause cardiac disease. Will recommend cardiology consultation.     Again, thank you for allowing me to participate in the care of your patient.      Sincerely,    Gregory Kenyon MD

## 2025-06-12 NOTE — PROGRESS NOTES
"Follow up visit for 54 year old woman seen initially for burning sensation in feet. This has improved significantly, though she still perceives a \"chronic low grade buzzing\" in the feet. Still has focal sensitivity and sensory loss in the plantar aspect of the left foot. She has also noted intermittent belching, loose stools, flatulence, and abdominal distention recently. Reduced perception of need to urinate, reduced stream, and sexual dysfunction. These symptoms have developed within the past 6 months. 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 She wonders whether this is related. 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     Impression    Sensory symptoms without documented evidence of neuropathy.  Several symptoms as noted above, query whether a unifying diagnosis is present.      Recommendations [copied from patient instructions]    We reviewed your results and symptoms today. Recommendations are as follows:    I will make a rheumatology referral for possible seronegative Sjogren's syndrome.  I will discuss your autonomic symptoms with a colleague, and whether you are a candidate for further testing.   Review your spine and brain imaging with a neurosurgeon; you indicated there are experts in the field famiiar with EDS.  Please ask your eye doctor to evaluate for objective evidence of reduced tear production, either by Schirmer's test or another method.  GI referral locally or gastric emptying test.  Return as needed. [facility].         Gregory Kenyon M.D.      40 minutes spent on the date of the encounter on chart review, history and examination, documentation and further activities as noted above.     Addendum: discussed titin variant informally with genetics service; this is in a region, and the type of mutation, that could cause cardiac disease. Will recommend cardiology consultation.   "

## 2025-06-16 ENCOUNTER — PATIENT OUTREACH (OUTPATIENT)
Dept: CARE COORDINATION | Facility: CLINIC | Age: 55
End: 2025-06-16
Payer: COMMERCIAL

## 2025-06-30 ENCOUNTER — THERAPY VISIT (OUTPATIENT)
Dept: PHYSICAL THERAPY | Facility: CLINIC | Age: 55
End: 2025-06-30
Attending: FAMILY MEDICINE
Payer: COMMERCIAL

## 2025-06-30 DIAGNOSIS — Q79.62 HYPERMOBILE EHLERS-DANLOS SYNDROME: Primary | ICD-10-CM

## 2025-06-30 PROCEDURE — 97162 PT EVAL MOD COMPLEX 30 MIN: CPT | Mod: GP

## 2025-06-30 PROCEDURE — 97530 THERAPEUTIC ACTIVITIES: CPT | Mod: GP

## 2025-06-30 NOTE — PROGRESS NOTES
PHYSICAL THERAPY EVALUATION  Type of Visit: Evaluation       Fall Risk Screen:  Have you fallen 2 or more times in the past year?: No  Have you fallen and had an injury in the past year?: No  Is patient receiving Physical Therapy Services?: Yes    Subjective   Pt reports that she has undergone PT for EDS a few times, most recently working with Judy. In general: feet tend to be an issue, working on breaking in new inserts. Has some burning sensation at feet and up ankles. Midfoot fusion on R foot. Reports being fairly clumsy but inserts seem to be helping. Knees, hips, and back tend to be okay. Currently, potential TOS on L. Raking and yard work seem to make it feel better. Otherwise, seems to be a vise gripping at the base of her neck on the L.  Very R hand dominant, at times will lose  strength on R including dropping coffee mug. Does have CCI, has an eclipse C collar to be used for rest breaks and posture work when not patient facing during her work days. Some positional symptoms, noted changed in hearing with neck in extension, can see stars at times with fast positional changes. No POTS diagnosis. Minimal headaches but some teeth grinding, some TMJ pain but mostly appearing just behind ear. That seems to be the worst pain, often times starting in the afternoon. Will take ibuprofen for it.         Presenting condition or subjective complaint: hEDS, Dx with CCI and Chiari last May, TOS never addressed  Date of onset: 05/08/25 (date of order)    Relevant medical history: Change in skin color; Fibromyalgia; Migraines or headaches; Pain at night or rest; Smoking   Dates & types of surgery: 2018: anterior tibilais complete rupture reattachment; 2020: mid-foot fusion (both R foot); lots of gyno surgeries    Prior diagnostic imaging/testing results: MRI; CT scan     Prior therapy history for the same diagnosis, illness or injury: Yes I saw Judy Matos a couple times and did the EDS program at [facility] a few yrs  ago    Prior Level of Function  Transfers: Independent  Ambulation: Independent  ADL: Independent    Living Environment  Social support: With a significant other or spouse   Type of home: House; 2-story; Basement   Stairs to enter the home: Yes   Is there a railing: Yes     Ramp: No   Stairs inside the home: Yes   Is there a railing: Yes     Help at home: None  Equipment owned:       Employment: Yes Mental Health Therapist  Hobbies/Interests: Video games, reading, varies    Patient goals for therapy: some exercise and activities    Pain assessment: see above     Objective      OBSERVATION: Pt clearly motivated to participate in PT.   INTEGUMENTARY: Intact  POSTURE: Frequent adjustment of posture, most stable seated posture of crossed legs and leaning onto L hip. Protracted shoulders and forward head position  PALPATION: tightness noted through B UT  RANGE OF MOTION: ROM generally increased due to hypermobility but tightness noted at B UT and L scalenes, as well as in hip adductors and gastrocs  STRENGTH: Shoulder strength grossly 4/5, able to get scap squeeze but initiates w/UT activation first, weakness of core and hip abductors    GAIT:   Level of [place]: WNL  Assistive Device(s): Orthotics  Gait Deviations: variable step length and foot placement, decreased hip abductor activation    BALANCE: SLS for approx 10 sec on L w/increased trunk sway, 15 sec on R more stable - wearing inserts for both    SENSATION: change in sensation at B feet and ankles. 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  The following significant findings have  been identified: Pain, Decreased strength, Impaired balance, Decreased proprioception, Impaired sensation, Impaired gait, Impaired muscle performance, Decreased activity tolerance, Impaired posture, and Instability. These impairments interfere with their ability to perform self care tasks, work tasks, recreational activities, household chores, household mobility, and community mobility as compared to previous level of function. Pt will benefit from skilled PT interventions to address these impairments and improve overall function.    Clinical Decision Making (Complexity):  Clinical Presentation: Stable/Uncomplicated  Clinical Presentation Rationale: based on medical and personal factors listed in PT evaluation  Clinical Decision Making (Complexity): Moderate complexity    PLAN OF CARE  Treatment Interventions:  Modalities: Cryotherapy, Cupping, Dry Needling, E-stim, Hot Pack, Ultrasound, as needed  Interventions: Gait Training, Manual Therapy, Neuromuscular Re-education, Therapeutic Activity, Therapeutic Exercise, as needed    Long Term Goals     PT Goal 1  Goal Identifier: HEP  Goal Description: Pt will demonstrate accurate compliance to HEP >4 days per wk to IND manage symptoms and progress function  Target Date: 12/30/25  PT Goal 2  Goal Identifier: [place] Score  Goal Description: Pt will improve score on [place] impact of hypermobility by at least 50 to show a clinically significant improvement in tolerance for daily activity.  Target Date: 12/30/25  PT Goal 3  Goal Identifier: Balance  Goal Description: Pt will maintain SLS balance on both legs w/o use of orthotics for >20 seconds w/o compensatory posturing to show improved stability and body awareness to help prevent future injury.  Target Date: 12/30/25  PT Goal 4  Goal Identifier: Posture  Goal Description: Pt will maintain neutral posture in sitting and standing for >10 min to improve joint position and avoid potential joint damage while working at  home  Target Date: 12/30/25      Frequency of Treatment: 1x per wk, reducing as able  Duration of Treatment: 6 months    Recommended Referrals to Other Professionals: n/a  Education Assessment:   Learner/Method: Patient;Listening;Demonstration;No Barriers to Learning    Risks and benefits of evaluation/treatment have been explained.   Patient/Family/caregiver agrees with Plan of Care.     Evaluation Time:     PT Eval, Moderate Complexity Minutes (65680): 30     Signing Clinician: Monique Jimenez PT

## 2025-07-14 ENCOUNTER — THERAPY VISIT (OUTPATIENT)
Dept: PHYSICAL THERAPY | Facility: CLINIC | Age: 55
End: 2025-07-14
Attending: FAMILY MEDICINE
Payer: COMMERCIAL

## 2025-07-14 DIAGNOSIS — Q79.62 HYPERMOBILE EHLERS-DANLOS SYNDROME: Primary | ICD-10-CM

## 2025-07-14 PROCEDURE — 97110 THERAPEUTIC EXERCISES: CPT | Mod: GP

## 2025-07-28 ENCOUNTER — THERAPY VISIT (OUTPATIENT)
Dept: PHYSICAL THERAPY | Facility: CLINIC | Age: 55
End: 2025-07-28
Attending: FAMILY MEDICINE
Payer: COMMERCIAL

## 2025-07-28 DIAGNOSIS — Q79.62 HYPERMOBILE EHLERS-DANLOS SYNDROME: Primary | ICD-10-CM

## 2025-07-28 PROCEDURE — 97110 THERAPEUTIC EXERCISES: CPT | Mod: GP

## 2025-08-11 ENCOUNTER — THERAPY VISIT (OUTPATIENT)
Dept: PHYSICAL THERAPY | Facility: CLINIC | Age: 55
End: 2025-08-11
Attending: FAMILY MEDICINE
Payer: COMMERCIAL

## 2025-08-11 DIAGNOSIS — Q79.62 HYPERMOBILE EHLERS-DANLOS SYNDROME: Primary | ICD-10-CM

## 2025-08-11 PROCEDURE — 97110 THERAPEUTIC EXERCISES: CPT | Mod: GP | Performed by: PHYSICAL THERAPIST

## 2025-08-18 ENCOUNTER — VIRTUAL VISIT (OUTPATIENT)
Dept: PHYSICAL THERAPY | Facility: CLINIC | Age: 55
End: 2025-08-18
Attending: FAMILY MEDICINE
Payer: COMMERCIAL

## 2025-08-18 DIAGNOSIS — Q79.62 HYPERMOBILE EHLERS-DANLOS SYNDROME: Primary | ICD-10-CM

## 2025-08-18 PROCEDURE — 97110 THERAPEUTIC EXERCISES: CPT | Mod: GP,GT,95 | Performed by: PHYSICAL THERAPIST

## 2025-08-18 PROCEDURE — 97530 THERAPEUTIC ACTIVITIES: CPT | Mod: GP,GT,95 | Performed by: PHYSICAL THERAPIST

## 2025-08-28 ENCOUNTER — TELEPHONE (OUTPATIENT)
Dept: NEUROLOGY | Facility: CLINIC | Age: 55
End: 2025-08-28
Payer: COMMERCIAL

## 2025-08-28 DIAGNOSIS — R89.8 ABNORMAL GENETIC TEST: Primary | ICD-10-CM

## 2025-09-01 ENCOUNTER — PATIENT OUTREACH (OUTPATIENT)
Dept: CARE COORDINATION | Facility: CLINIC | Age: 55
End: 2025-09-01
Payer: COMMERCIAL

## (undated) RX ORDER — LIDOCAINE HYDROCHLORIDE 10 MG/ML
INJECTION, SOLUTION EPIDURAL; INFILTRATION; INTRACAUDAL; PERINEURAL
Status: DISPENSED
Start: 2025-04-28